# Patient Record
Sex: FEMALE | Race: WHITE | Employment: OTHER | URBAN - METROPOLITAN AREA
[De-identification: names, ages, dates, MRNs, and addresses within clinical notes are randomized per-mention and may not be internally consistent; named-entity substitution may affect disease eponyms.]

---

## 2017-12-13 ENCOUNTER — HOSPITAL ENCOUNTER (OUTPATIENT)
Dept: MAMMOGRAPHY | Age: 67
Discharge: HOME | End: 2017-12-13
Attending: INTERNAL MEDICINE
Payer: MEDICARE

## 2017-12-13 DIAGNOSIS — Z12.31 VISIT FOR SCREENING MAMMOGRAM: ICD-10-CM

## 2017-12-13 PROCEDURE — 77063 BREAST TOMOSYNTHESIS BI: CPT

## 2018-04-22 RX ORDER — ATROPINE SULFATE 0.1 MG/ML
0.5 INJECTION INTRAVENOUS
Status: CANCELLED | OUTPATIENT
Start: 2018-04-22 | End: 2018-04-23

## 2018-04-22 RX ORDER — DEXTROMETHORPHAN/PSEUDOEPHED 2.5-7.5/.8
1.2 DROPS ORAL
Status: CANCELLED | OUTPATIENT
Start: 2018-04-22

## 2018-04-27 ENCOUNTER — HOSPITAL ENCOUNTER (OUTPATIENT)
Age: 68
Setting detail: OUTPATIENT SURGERY
Discharge: HOME | End: 2018-04-27
Attending: INTERNAL MEDICINE | Admitting: INTERNAL MEDICINE
Payer: MEDICARE

## 2018-04-27 VITALS
DIASTOLIC BLOOD PRESSURE: 73 MMHG | OXYGEN SATURATION: 97 % | BODY MASS INDEX: 20.53 KG/M2 | WEIGHT: 120.25 LBS | HEIGHT: 64 IN | TEMPERATURE: 96.8 F | SYSTOLIC BLOOD PRESSURE: 113 MMHG | RESPIRATION RATE: 16 BRPM | HEART RATE: 62 BPM

## 2018-04-27 PROCEDURE — 77030013991 HC SNR POLYP ENDOSC BSC -A: Performed by: INTERNAL MEDICINE

## 2018-04-27 PROCEDURE — 74011250636 HC RX REV CODE- 250/636: Performed by: INTERNAL MEDICINE

## 2018-04-27 PROCEDURE — 74011250636 HC RX REV CODE- 250/636

## 2018-04-27 PROCEDURE — G0500 MOD SEDAT ENDO SERVICE >5YRS: HCPCS | Performed by: INTERNAL MEDICINE

## 2018-04-27 PROCEDURE — 88305 TISSUE EXAM BY PATHOLOGIST: CPT | Performed by: INTERNAL MEDICINE

## 2018-04-27 PROCEDURE — 77010033678 HC OXYGEN DAILY

## 2018-04-27 PROCEDURE — 99153 MOD SED SAME PHYS/QHP EA: CPT | Performed by: INTERNAL MEDICINE

## 2018-04-27 PROCEDURE — 77030003657 HC NDL SCLER BSC -B: Performed by: INTERNAL MEDICINE

## 2018-04-27 PROCEDURE — 77030020256 HC SOL INJ NACL 0.9%  500ML: Performed by: INTERNAL MEDICINE

## 2018-04-27 PROCEDURE — 76040000008: Performed by: INTERNAL MEDICINE

## 2018-04-27 RX ORDER — TRETINOIN 0.5 MG/G
CREAM TOPICAL
COMMUNITY

## 2018-04-27 RX ORDER — GLUCOSAMINE/CHONDR SU A SOD 750-600 MG
5000 TABLET ORAL DAILY
COMMUNITY

## 2018-04-27 RX ORDER — EPINEPHRINE 0.1 MG/ML
1 INJECTION INTRACARDIAC; INTRAVENOUS
Status: DISCONTINUED | OUTPATIENT
Start: 2018-04-27 | End: 2018-04-27 | Stop reason: HOSPADM

## 2018-04-27 RX ORDER — FENTANYL CITRATE 50 UG/ML
100 INJECTION, SOLUTION INTRAMUSCULAR; INTRAVENOUS
Status: DISCONTINUED | OUTPATIENT
Start: 2018-04-27 | End: 2018-04-27 | Stop reason: HOSPADM

## 2018-04-27 RX ORDER — FLUMAZENIL 0.1 MG/ML
0.2 INJECTION INTRAVENOUS
Status: DISCONTINUED | OUTPATIENT
Start: 2018-04-27 | End: 2018-04-27 | Stop reason: HOSPADM

## 2018-04-27 RX ORDER — ESTRADIOL 10 UG/1
10 INSERT VAGINAL
COMMUNITY

## 2018-04-27 RX ORDER — FISH OIL/DHA/EPA 1200-144MG
CAPSULE ORAL DAILY
COMMUNITY

## 2018-04-27 RX ORDER — MIDAZOLAM HYDROCHLORIDE 1 MG/ML
.5-5 INJECTION, SOLUTION INTRAMUSCULAR; INTRAVENOUS
Status: DISCONTINUED | OUTPATIENT
Start: 2018-04-27 | End: 2018-04-27 | Stop reason: HOSPADM

## 2018-04-27 RX ORDER — CLOBETASOL PROPIONATE 0.5 MG/G
AEROSOL, FOAM TOPICAL 2 TIMES DAILY
Status: ON HOLD | COMMUNITY
End: 2019-05-03

## 2018-04-27 RX ORDER — MULTIVITAMIN
1 TABLET ORAL 2 TIMES DAILY
COMMUNITY

## 2018-04-27 RX ORDER — NALOXONE HYDROCHLORIDE 0.4 MG/ML
0.4 INJECTION, SOLUTION INTRAMUSCULAR; INTRAVENOUS; SUBCUTANEOUS
Status: DISCONTINUED | OUTPATIENT
Start: 2018-04-27 | End: 2018-04-27 | Stop reason: HOSPADM

## 2018-04-27 RX ORDER — SODIUM CHLORIDE 9 MG/ML
100 INJECTION, SOLUTION INTRAVENOUS CONTINUOUS
Status: DISPENSED | OUTPATIENT
Start: 2018-04-27 | End: 2018-04-27

## 2018-04-27 RX ADMIN — SODIUM CHLORIDE 100 ML/HR: 900 INJECTION, SOLUTION INTRAVENOUS at 11:55

## 2018-04-27 NOTE — DISCHARGE INSTRUCTIONS
Nidhi Valerio   DISCHARGE SUMMARY from Nurse    PATIENT INSTRUCTIONS:    After general anesthesia or intravenous sedation, for 24 hours or while taking prescription Narcotics:  · Limit your activities  · Do not drive and operate hazardous machinery  · Do not make important personal or business decisions  · Do  not drink alcoholic beverages  · If you have not urinated within 8 hours after discharge, please contact your surgeon on call. Report the following to your surgeon:  · Excessive pain, swelling, redness or odor of or around the surgical area  · Temperature over 100.5  · Nausea and vomiting lasting longer than 4 hours or if unable to take medications  · Any signs of decreased circulation or nerve impairment to extremity: change in color, persistent  numbness, tingling, coldness or increase pain  · Any questions    What to do at Home:  Recommended activity:     If you experience any of the following symptoms , please follow up with [facility]  *  Please give a list of your current medications to your Primary Care Provider. *  Please update this list whenever your medications are discontinued, doses are      changed, or new medications (including over-the-counter products) are added. *  Please carry medication information at all times in case of emergency situations. These are general instructions for a healthy lifestyle:    No smoking/ No tobacco products/ Avoid exposure to second hand smoke  Surgeon General's Warning:  Quitting smoking now greatly reduces serious risk to your health.     Obesity, smoking, and sedentary lifestyle greatly increases your risk for illness    A healthy diet, regular physical exercise & weight monitoring are important for maintaining a healthy lifestyle    You may be retaining fluid if you have a history of heart failure or if you experience any of the following symptoms:  Weight gain of 3 pounds or more overnight or 5 pounds in a week, increased swelling in our hands or feet or shortness of breath while lying flat in bed. Please call your doctor as soon as you notice any of these symptoms; do not wait until your next office visit. Recognize signs and symptoms of STROKE:    F-face looks uneven    A-arms unable to move or move unevenly    S-speech slurred or non-existent    T-time-call 911 as soon as signs and symptoms begin-DO NOT go       Back to bed or wait to see if you get better-TIME IS BRAIN. Warning Signs of HEART ATTACK     Call 911 if you have these symptoms:   Chest discomfort. Most heart attacks involve discomfort in the center of the chest that lasts more than a few minutes, or that goes away and comes back. It can feel like uncomfortable pressure, squeezing, fullness, or pain.  Discomfort in other areas of the upper body. Symptoms can include pain or discomfort in one or both arms, the back, neck, jaw, or stomach.  Shortness of breath with or without chest discomfort.  Other signs may include breaking out in a cold sweat, nausea, or lightheadedness. Don't wait more than five minutes to call 911 - MINUTES MATTER! Fast action can save your life. Calling 911 is almost always the fastest way to get lifesaving treatment. Emergency Medical Services staff can begin treatment when they arrive -- up to an hour sooner than if someone gets to the hospital by car. The discharge information has been reviewed with the patient and spouse. The patient and spouse verbalized understanding. Discharge medications reviewed with the patient and spouse and appropriate educational materials and side effects teaching were provided.   ___________________________________________________________________________________________________________________________________  [id number]  1950    COLON DISCHARGE INSTRUCTIONS    Discomfort:  Redness at IV site- apply warm compress to area; if redness or soreness persist- contact your physician  There may be a slight amount of blood passed from the rectum  Gaseous discomfort- walking, belching will help relieve any discomfort  You may not operate a vehicle til the next day. You may not engage in an occupation involving machinery or appliances til the next day. You may not drink alcoholic beverages til the next day. DIET:   High fiber diet. ACTIVITY:  You may not  resume your normal daily activities til the next day. it is recommended that you spend the remainder of the day resting -  avoid any strenuous activity. CALL M.D.  IF ANY SIGN OF:   Increasing pain, nausea, vomiting  Abdominal distension (swelling)  New increased bleeding (oral or rectal)  Fever (chills)  Pain in chest area  Bloody discharge from nose or mouth  Shortness of breath    You may not  take any Advil, Aspirin, Ibuprofen, Motrin, Aleve, or Goodys for 5 days, ONLY  Tylenol as needed for pain. Post procedure diagnosis:  WEAK ANAL SPHINTER TONE, TORTUOUS COLON, POLYP ASCENDING COLON    Follow-up Instructions: Your follow up colonoscopy will be in 3 to 5 years pending the result of the histology. We will notify you the results of your biopsy by letter within 2 weeks.     Neptali Ward MD  April 27, 2018   Patient armband removed and shredded

## 2018-04-27 NOTE — IP AVS SNAPSHOT
Marylou Torres 
 
 
 [address] [facility] [address] 
[phone] Patient: Bhumika Interiano MRN: [id number] MNI:[id number] About your hospitalization You were admitted on:  April 27, 2018 You last received care in the:  [facility] ENDOSCOPY You were discharged on:  April 27, 2018 Why you were hospitalized Your primary diagnosis was:  Not on File Follow-up Information Follow up With Details Comments Contact Info Anne Molina, [address] [facility] [address] 
[phone] Discharge Orders None A check srinivas indicates which time of day the medication should be taken. My Medications CONTINUE taking these medications Instructions Each Dose to Equal  
 Morning Noon Evening Bedtime Biotin 2,500 mcg Cap Your last dose was: Your next dose is: Take 5,000 mcg by mouth daily. 5000 mcg  
    
   
   
   
  
 calcium-cholecalciferol (D3) tablet Commonly known as:  CALTRATE 600+D Your last dose was: Your next dose is: Take 1 Tab by mouth two (2) times a day. 1 Tab CENTRUM SILVER ULTRA WOMEN'S PO Your last dose was: Your next dose is: Take  by mouth daily. clobetasol 0.05 % topical foam  
Commonly known as:  OLUX Your last dose was: Your next dose is:    
   
   
 Apply  to affected area two (2) times a day. use thin film on affected area  
     
   
   
   
  
 estradiol 10 mcg Tab vaginal tablet Commonly known as:  Emily Rothman Your last dose was: Your next dose is: Insert 10 mcg into vagina every Tuesday and Friday. 10 mcg FEMHRT LOW DOSE 0.5-2.5 mg-mcg per tablet Generic drug:  norethindrone-ethinyl estradiol Your last dose was: Your next dose is: Take 1 Tab by mouth daily. Indications: HRT  
 1 Tab  
    
   
   
   
  
 fish oil-dha-epa 1,200-144-216 mg Cap Your last dose was: Your next dose is: Take  by mouth daily. PROBIOTIC 4X 10-15 mg Tbec Generic drug:  B.infantis-B.ani-B.long-B.bifi Your last dose was: Your next dose is: Take  by mouth daily. psyllium Powd Commonly known as:  METAMUCIL Your last dose was: Your next dose is: Take  by mouth daily. tretinoin 0.05 % topical cream  
Commonly known as:  RETIN-A Your last dose was: Your next dose is:    
   
   
 Apply  to affected area nightly. Discharge Instructions Meaghan Hylton DISCHARGE SUMMARY from Nurse PATIENT INSTRUCTIONS: 
 
After general anesthesia or intravenous sedation, for 24 hours or while taking prescription Narcotics: · Limit your activities · Do not drive and operate hazardous machinery · Do not make important personal or business decisions · Do  not drink alcoholic beverages · If you have not urinated within 8 hours after discharge, please contact your surgeon on call. Report the following to your surgeon: 
· Excessive pain, swelling, redness or odor of or around the surgical area · Temperature over 100.5 · Nausea and vomiting lasting longer than 4 hours or if unable to take medications · Any signs of decreased circulation or nerve impairment to extremity: change in color, persistent  numbness, tingling, coldness or increase pain · Any questions What to do at Home: 
Recommended activity: If you experience any of the following symptoms , please follow up with [facility] *  Please give a list of your current medications to your Primary Care Provider.  
 
*  Please update this list whenever your medications are discontinued, doses are 
 changed, or new medications (including over-the-counter products) are added. *  Please carry medication information at all times in case of emergency situations. These are general instructions for a healthy lifestyle: No smoking/ No tobacco products/ Avoid exposure to second hand smoke Surgeon General's Warning:  Quitting smoking now greatly reduces serious risk to your health. Obesity, smoking, and sedentary lifestyle greatly increases your risk for illness A healthy diet, regular physical exercise & weight monitoring are important for maintaining a healthy lifestyle You may be retaining fluid if you have a history of heart failure or if you experience any of the following symptoms:  Weight gain of 3 pounds or more overnight or 5 pounds in a week, increased swelling in our hands or feet or shortness of breath while lying flat in bed. Please call your doctor as soon as you notice any of these symptoms; do not wait until your next office visit. Recognize signs and symptoms of STROKE: 
 
F-face looks uneven A-arms unable to move or move unevenly S-speech slurred or non-existent T-time-call 911 as soon as signs and symptoms begin-DO NOT go Back to bed or wait to see if you get better-TIME IS BRAIN. Warning Signs of HEART ATTACK Call 911 if you have these symptoms: 
? Chest discomfort. Most heart attacks involve discomfort in the center of the chest that lasts more than a few minutes, or that goes away and comes back. It can feel like uncomfortable pressure, squeezing, fullness, or pain. ? Discomfort in other areas of the upper body. Symptoms can include pain or discomfort in one or both arms, the back, neck, jaw, or stomach. ? Shortness of breath with or without chest discomfort. ? Other signs may include breaking out in a cold sweat, nausea, or lightheadedness. Don't wait more than five minutes to call [address]!  Fast action can save your life. Calling 911 is almost always the fastest way to get lifesaving treatment. Emergency Medical Services staff can begin treatment when they arrive  up to an hour sooner than if someone gets to the hospital by car. The discharge information has been reviewed with the patient and spouse. The patient and spouse verbalized understanding. Discharge medications reviewed with the patient and spouse and appropriate educational materials and side effects teaching were provided. ___________________________________________________________________________________________________________________________________ 
[id number] 
1950 COLON DISCHARGE INSTRUCTIONS Discomfort: 
Redness at IV site- apply warm compress to area; if redness or soreness persist- contact your physician There may be a slight amount of blood passed from the rectum Gaseous discomfort- walking, belching will help relieve any discomfort You may not operate a vehicle til the next day. You may not engage in an occupation involving machinery or appliances til the next day. You may not drink alcoholic beverages til the next day. DIET: 
 High fiber diet. ACTIVITY: 
You may not  resume your normal daily activities til the next day. it is recommended that you spend the remainder of the day resting -  avoid any strenuous activity. CALL DONALD Lobato ANY SIGN OF: Increasing pain, nausea, vomiting Abdominal distension (swelling) New increased bleeding (oral or rectal) Fever (chills) Pain in chest area Bloody discharge from nose or mouth Shortness of breath You may not  take any Advil, Aspirin, Ibuprofen, Motrin, Aleve, or Goodys for 5 days, ONLY  Tylenol as needed for pain. Post procedure diagnosis:  WEAK ANAL SPHINTER TONE, TORTUOUS COLON, POLYP ASCENDING COLON Follow-up Instructions: Your follow up colonoscopy will be in 3 to 5 years pending the result of the histology. We will notify you the results of your biopsy by letter within 2 weeks. Rick Dee MD 
April 27, 2018 Patient armband removed and shredded Introducing [facility]! [facility] introduces [facility] patient portal. Now you can access parts of your medical record, email your doctor's office, and request medication refills online. 1. In your internet browser, go to [email or web address]. [employer] 2. Click on the First Time User? Click Here link in the Sign In box. You will see the New Member Sign Up page. 3. Enter your [facility] Access Code exactly as it appears below. You will not need to use this code after youve completed the sign-up process. If you do not sign up before the expiration date, you must request a new code. · [facility] Access Code: [email or web address] Expires: 7/26/2018  2:27 PM 
 
4. Enter the last four digits of your Social Security Number (xxxx) and Date of Birth (mm/dd/yyyy) as indicated and click Submit. You will be taken to the next sign-up page. 5. Create a [facility] ID. This will be your [facility] login ID and cannot be changed, so think of one that is secure and easy to remember. 6. Create a [facility] password. You can change your password at any time. 7. Enter your Password Reset Question and Answer. This can be used at a later time if you forget your password. 8. Enter your e-mail address. You will receive e-mail notification when new information is available in [address] 9. Click Sign Up. You can now view and download portions of your medical record. 10. Click the Download Summary menu link to download a portable copy of your medical information. If you have questions, please visit the Frequently Asked Questions section of the [facility] website. Remember, [facility] is NOT to be used for urgent needs. For medical emergencies, dial 911. Now available from your iPhone and Android! Introducing Sebastian Dale As a [employer] [facility] patient, I wanted to make you aware of our electronic visit tool called Sebastian Dale. [email or web address] allows you to connect within minutes with a medical provider 24 hours a day, seven days a week via a mobile device or tablet or logging into a secure website from your computer. You can access Sebastian Osullivanfin from anywhere in the [place]. A virtual visit might be right for you when you have a simple condition and feel like you just dont want to get out of bed, or cant get away from work for an appointment, when your regular [facility] provider is not available (evenings, weekends or holidays), or when youre out of town and need minor care. Electronic visits cost only $49 and if the [employer] provider determines a prescription is needed to treat your condition, one can be electronically transmitted to a nearby pharmacy*. Please take a moment to enroll today if you have not already done so. The enrollment process is free and takes just a few minutes. To enroll, please download the [email or web address] nathaniel to your tablet or phone, or visit [email or web address] to enroll on your computer. And, as an [address] [place] [address] patient with a [employer] account, the results of your visits will be scanned into your electronic medical record and your primary care provider will be able to view the scanned results. We urge you to continue to see your regular [facility] provider for your ongoing medical care. And while your primary care provider may not be the one available when you seek a Sebastian Johnsongefin virtual visit, the peace of mind you get from getting a real diagnosis real time can be priceless. For more information on Sebastian Alexgefin, view our Frequently Asked Questions (FAQs) at [email or web address]. Sincerely, 
 
Stephen West MD 
Chief Medical Officer [facility] *:  certain medications cannot be prescribed via Sebastian Dale Providers Seen During Your Hospitalization Provider Specialty Primary office phone Leah Smith MD Gastroenterology [phone] Your Primary Care Physician (PCP) Primary Care Physician Office Phone Office Fax Jun Martinez, [address] [place] [address] [phone] You are allergic to the following No active allergies Recent Documentation Height Weight Breastfeeding? BMI OB Status Smoking Status 1.626 m 54.5 kg No 20.64 kg/m2 Postmenopausal Never Smoker Emergency Contacts Name Discharge Info Relation Home Work Mobile LizzieMike DISCHARGE CAREGIVER [3] Spouse [3] [phone] Patient Belongings The following personal items are in your possession at time of discharge: 
  Dental Appliances: None  Visual Aid: Glasses Please provide this summary of care documentation to your next provider. Signatures-by signing, you are acknowledging that this After Visit Summary has been reviewed with you and you have received a copy. Patient Signature:  ____________________________________________________________ Date:  ____________________________________________________________  
  
Alexandra Holmanulds Provider Signature:  ____________________________________________________________ Date:  ____________________________________________________________

## 2018-04-27 NOTE — PROCEDURES
[facility]  Colonoscopy Procedure Report  _______________________________________________________  Patient: Tank Escobar                                         Attending Physician: Radha Lee MD    Patient ID: [id number]                                      Referring Physician: Maddison Flaherty MD    Exam Date: April 27, 2018 _______________________________________________________      Introduction: A  79 y.o. female patient, presents for outpatient Colonoscopy    Indications: Screen colon cancer,  Had a previous colonoscopy by Dr Jose Brown on Jan 18, 2013:  one small tubular adenoma in the sigmoid and  one medium sized hyperplastic polyp in the ascending . Mild sigmoid diverticulosis, small internal hemorrhoids. She has no family history of colon cancer and asymptomatic. Consent: The benefits, risks, and alternatives to the procedure were discussed and informed consent was obtained from the patient. Preparation: EKG, pulse, pulse oximetry and blood pressure were monitored throughout the procedure. ASA Classification: Class 1 - . The heart is an S1-S2 and regular heart rate and rhythm. Lungs are clear to auscultation and percussion. Abdomen is soft, nondistended, and nontender. Mental Status: awake, alert, and oriented to person, place, and time    Medications:  · Fentanyl 100 mcg IV before procedure. · Versed 5 mg IV throughout the procedure. Rectal Exam: Decreased anal sphincter tone. No Blood. Pathology Specimens: One specimens removed. Procedure: The colonoscope was passed with difficulty through the anus under direct visualization and advanced to the cecum and 10 cm inside the terminal ileum. The patient required positioning on the back to aid in the passage of the scope. The scope was withdrawn and the mucosa was carefully examined. The quality of the preparation was excellent. The views were excellent. The patient's toleration of the procedure was excellent.  The exam was done twice to the cecum. Total time is 42 minutes and withdrawal time is 29 minutes. Findings:    Rectum:   Small internal hemorrhoids. Sigmoid:   Very tortuous and difficult sigmoid colon with mild diverticulosis. Descending Colon:   Normal  Transverse Colon:   Normal  Ascending Colon:   9 mm very flat polyp? in the proximal ascending colon very hard to see located behind a fold and starting at the edge of a small diverticulum hidden behind that fold. Initially, I had to inject it with Saline to lift it up but the liquid desipated and leaked out quickly then I injected the Eliview liquid which is heavier, but the polyp was very slippery and difficult to grab with the snare for that reason and the difficult location only the central part of the polyp was resected. Also being next to a diverticulum would make pursuing any further resection at this level reskier. Cecum:   Normal  Terminal Ileum:   Normal      Unplanned Events: There were no unplanned events. Estimated Blood Loss: None  Impressions:    Decreased anal sphincter tone. Small internal hemorrhoids. Very tortuous and difficult sigmoid colon with mild sigmoid diverticulosis. 9 mm very flat polyp? in the proximal ascending colon very hard to see located behind a fold and starting at the edge of a small diverticulum hidden behind that fold. Initially, I had to inject it with Saline to lift it up but the liquid desipated and leaked out quickly then I injected the Eliview liquid which is heavier, but the polyp was very slippery and difficult to grab with the snare for that reason and the difficult location only the central part of the polyp was resected. Also being next to a diverticulum would make pursuing any further resection at this level reskier. Normal Mucosa. Complications: None; patient tolerated the procedure well. Recommendations:  · Discharge home when standard parameters are met. · Resume a high fiber diet.   · Colonoscopy recommendation in 3 to 5 years awaiting the result of the histology.     Procedure Codes:    · Susy Soren [[id number]]    Endoscope Information:  Model Number(s)    [id number]     Assistant: None      Signed By: Vik Hernandes MD Date: April 27, 2018

## 2019-03-07 ENCOUNTER — HOSPITAL ENCOUNTER (OUTPATIENT)
Dept: GENERAL RADIOLOGY | Age: 69
Discharge: HOME | End: 2019-03-07
Attending: INTERNAL MEDICINE
Payer: MEDICARE

## 2019-03-07 DIAGNOSIS — R10.84 GENERALIZED ABDOMINAL PAIN: ICD-10-CM

## 2019-03-07 PROCEDURE — 74011000255 HC RX REV CODE- 255: Performed by: INTERNAL MEDICINE

## 2019-03-07 PROCEDURE — 74250 X-RAY XM SM INT 1CNTRST STD: CPT

## 2019-03-07 RX ADMIN — BARIUM SULFATE 600 ML: 240 SUSPENSION ORAL at 09:50

## 2019-05-03 ENCOUNTER — HOSPITAL ENCOUNTER (OUTPATIENT)
Age: 69
Setting detail: OUTPATIENT SURGERY
Discharge: HOME | End: 2019-05-03
Attending: INTERNAL MEDICINE | Admitting: INTERNAL MEDICINE
Payer: MEDICARE

## 2019-05-03 VITALS
TEMPERATURE: 97.5 F | WEIGHT: 123.4 LBS | RESPIRATION RATE: 15 BRPM | OXYGEN SATURATION: 97 % | HEIGHT: 64 IN | SYSTOLIC BLOOD PRESSURE: 104 MMHG | DIASTOLIC BLOOD PRESSURE: 65 MMHG | BODY MASS INDEX: 21.07 KG/M2 | HEART RATE: 73 BPM

## 2019-05-03 PROCEDURE — 74011250636 HC RX REV CODE- 250/636

## 2019-05-03 PROCEDURE — 77030032490 HC SLV COMPR SCD KNE COVD -B: Performed by: INTERNAL MEDICINE

## 2019-05-03 PROCEDURE — 77030013991 HC SNR POLYP ENDOSC BSC -A: Performed by: INTERNAL MEDICINE

## 2019-05-03 PROCEDURE — 99153 MOD SED SAME PHYS/QHP EA: CPT | Performed by: INTERNAL MEDICINE

## 2019-05-03 PROCEDURE — 88305 TISSUE EXAM BY PATHOLOGIST: CPT

## 2019-05-03 PROCEDURE — 74011250636 HC RX REV CODE- 250/636: Performed by: INTERNAL MEDICINE

## 2019-05-03 PROCEDURE — G0500 MOD SEDAT ENDO SERVICE >5YRS: HCPCS | Performed by: INTERNAL MEDICINE

## 2019-05-03 PROCEDURE — 77030020256 HC SOL INJ NACL 0.9%  500ML: Performed by: INTERNAL MEDICINE

## 2019-05-03 PROCEDURE — 76040000008: Performed by: INTERNAL MEDICINE

## 2019-05-03 RX ORDER — NALOXONE HYDROCHLORIDE 0.4 MG/ML
0.4 INJECTION, SOLUTION INTRAMUSCULAR; INTRAVENOUS; SUBCUTANEOUS
Status: DISCONTINUED | OUTPATIENT
Start: 2019-05-03 | End: 2019-05-03 | Stop reason: HOSPADM

## 2019-05-03 RX ORDER — DIPHENHYDRAMINE HYDROCHLORIDE 50 MG/ML
50 INJECTION, SOLUTION INTRAMUSCULAR; INTRAVENOUS ONCE
Status: DISCONTINUED | OUTPATIENT
Start: 2019-05-03 | End: 2019-05-03 | Stop reason: HOSPADM

## 2019-05-03 RX ORDER — FENTANYL CITRATE 50 UG/ML
100 INJECTION, SOLUTION INTRAMUSCULAR; INTRAVENOUS
Status: DISCONTINUED | OUTPATIENT
Start: 2019-05-03 | End: 2019-05-03 | Stop reason: HOSPADM

## 2019-05-03 RX ORDER — EPINEPHRINE 0.1 MG/ML
1 INJECTION INTRACARDIAC; INTRAVENOUS
Status: DISCONTINUED | OUTPATIENT
Start: 2019-05-03 | End: 2019-05-03 | Stop reason: HOSPADM

## 2019-05-03 RX ORDER — MIDAZOLAM HYDROCHLORIDE 1 MG/ML
.25-5 INJECTION, SOLUTION INTRAMUSCULAR; INTRAVENOUS
Status: DISCONTINUED | OUTPATIENT
Start: 2019-05-03 | End: 2019-05-03 | Stop reason: HOSPADM

## 2019-05-03 RX ORDER — GABAPENTIN 300 MG/1
300 CAPSULE ORAL 3 TIMES DAILY
COMMUNITY

## 2019-05-03 RX ORDER — FLUMAZENIL 0.1 MG/ML
0.2 INJECTION INTRAVENOUS
Status: DISCONTINUED | OUTPATIENT
Start: 2019-05-03 | End: 2019-05-03 | Stop reason: HOSPADM

## 2019-05-03 RX ORDER — ASPIRIN 325 MG
650 TABLET ORAL DAILY
COMMUNITY

## 2019-05-03 RX ORDER — SODIUM CHLORIDE 0.9 % (FLUSH) 0.9 %
5-40 SYRINGE (ML) INJECTION AS NEEDED
Status: CANCELLED | OUTPATIENT
Start: 2019-05-03

## 2019-05-03 RX ORDER — SODIUM CHLORIDE 0.9 % (FLUSH) 0.9 %
5-40 SYRINGE (ML) INJECTION EVERY 8 HOURS
Status: CANCELLED | OUTPATIENT
Start: 2019-05-03

## 2019-05-03 RX ORDER — SODIUM CHLORIDE 9 MG/ML
150 INJECTION, SOLUTION INTRAVENOUS CONTINUOUS
Status: DISCONTINUED | OUTPATIENT
Start: 2019-05-03 | End: 2019-05-03 | Stop reason: HOSPADM

## 2019-05-03 RX ORDER — DEXTROMETHORPHAN/PSEUDOEPHED 2.5-7.5/.8
1.2 DROPS ORAL
Status: DISCONTINUED | OUTPATIENT
Start: 2019-05-03 | End: 2019-05-03 | Stop reason: HOSPADM

## 2019-05-03 RX ORDER — SODIUM CHLORIDE 9 MG/ML
1000 INJECTION, SOLUTION INTRAVENOUS CONTINUOUS
Status: CANCELLED | OUTPATIENT
Start: 2019-05-03 | End: 2019-05-03

## 2019-05-03 RX ORDER — ATROPINE SULFATE 0.1 MG/ML
0.5 INJECTION INTRAVENOUS
Status: DISCONTINUED | OUTPATIENT
Start: 2019-05-03 | End: 2019-05-03 | Stop reason: HOSPADM

## 2019-05-03 RX ADMIN — SODIUM CHLORIDE 150 ML/HR: 900 INJECTION, SOLUTION INTRAVENOUS at 09:09

## 2019-05-03 NOTE — H&P
This 79year old female presents for Colon Cancer Screening. History of Present Illness: 1. Colon Cancer Screening Prior screening:  colonoscopy and 13 Dr. Pranay Carr. Risk Factors: history of other malignancy and S-Breast.  Pertinent negatives include abdominal pain, change in bowel habits, change in stool caliber, constipation, decreased appetite, diarrhea, melena, nausea, rectal bleeding, vomiting, weight gain and weight loss. Additional information: No family history of colon cancer, No family history of Crohn's/colitis, No NSAID/ASA use, Appendectomy at age 17. She denies heart attack, SOB, CP, CVA, DM, GERD, paralysis. BM 3 times a day or more, no blood. No tobacco use and ETOH socially. Colonoscopy 13 by Dr. Pranay Carr indication screening. Findings mild diverticulosis in the sigmoid, internal hemorrhoids, Tubular adenoma in the sigmoid and hyperplastic polyp in the ascending colon. PROBLEM LIST: 
Problem Description Onset Date Lipoma of skin and subcutaneous tissue (excluding face) 2014 Impaired fasting glycaemia 2014 Internal hemorrhoids, simple 2016 Hyperlipidemia 2014 PAST MEDICAL/SURGICAL HISTORY   (Detailed) Disease/disorder Onset Date Management Date Comments Hemolytic uremic syndrome 2016     
  bunionectomy 2010   
  eyelid repair 2007 Appendectomy 1963 Left Foot Surgery Appendectomy DIAGNOSTICS HISTORY: 
Test Ordered Interpretation Result completed * Calcaneus, AP/ Lat Or Foot 3 Views 2012  Significant joint space narrowing of the left great toe joint. Previous osteotomy noted. 2012 Test Ordered Ordering Comments Modifier * Calcaneus, AP/ Lat Or Foot 3 Views 2012 Family History:  (Detailed) Relationship Family Member Name  Age at Death Condition Onset Age Cause of Death Father    Heart disease  N Father  Y 80 Alzheimer's Disease  N  
 Mother  N  Hypertension  N Sister  N  Cancer, breast  N Family History Comments Relationship Family Member Name Condition Comments Sister  Cancer, breast early 35s- no genetic testing EC 12/06/2017 -REcently did genetic testing and negative Social History:  (Detailed) Tobacco use reviewed. Preferred language is [place]. MARITAL STATUS/FAMILY/SOCIAL SUPPORT Currently . Tobacco use status: Never smoked tobacco. 
Smoking status: Never smoker. SMOKING STATUS Use Status Type Smoking Status Usage Per Day Years Used Total Pack Years  
no/never  Never smoker No passive smoke exposure. ALCOHOL There is a history of alcohol use. consumed socially. CAFFEINE The patient uses caffeine: coffee - 2 cups a day. LIFESTYLE 
Moderate activity level. Exercises 3-4 times a week. DIET 
healthy. Patient Status Completed with information received for patient transitioning into care. Medication Reconciliation Medications reconciled today. Medication Reviewed Adherence Medication Name Sig Desc Elsewhere Status  
taking as directed Centrum Silver tablet  N Verified  
taking as directed Caltrate-600 + D Vit D3 (800) 600 mg (1,500 mg)-800 unit tablet  N Verified  
taking as directed Fish Oil 360 mg-1,200 mg capsule take 1 by Oral route  every day Y Verified  
taking as directed Align 4 mg capsule  Y Verified  
taking as directed Metamucil 3.4 gram/5.4 gram oral powder  Y Verified  
taking as directed tretinoin 0.05 % topical cream apply by topical route  every day to the affected area(s) at bedtime N Verified  
taking as directed biotin 5,000 mcg disintegrating tablet  N Verified  
taking as directed Protopic 0.1 % topical ointment apply by topical route  every day a thin layer to the affected area(s) ; rub in gently and completely N Verified  
taking as directed clobetasol 0.05 % topical foam apply by topical route 2 times every day to the affected area(s) in the morning and evening N Verified  
taking as directed Latisse 0.03 % eyelash drops apply 1 drop by topical route  every day to applicator and apply to upper eyelid, along eyelashes, at nighttime N Verified  
taking as directed Suprep Bowel Prep Kit 17.5 gram-3.13 gram-1.6 gram oral solution take as prescribed by physician N Verified  
taking as directed doxycycline hyclate 100 mg capsule take 1 capsule by oral route  every day N Verified Medications (added, continued or stopped this visit): 
Started Medication Directions Instruction Stopped Align 4 mg capsule 02/07/2018 biotin 5,000 mcg disintegrating tablet 12/08/2013 Caltrate-600 + D Vit D3 (800) 600 mg (1,500 mg)-800 unit tablet 12/08/2013 Centrum Silver tablet 02/14/2018 clobetasol 0.05 % topical foam apply by topical route 2 times every day to the affected area(s) in the morning and evening 04/05/2018 doxycycline hyclate 100 mg capsule take 1 capsule by oral route  every day 06/09/2014 Fish Oil 360 mg-1,200 mg capsule take 1 by Oral route  every day 02/14/2018 Latisse 0.03 % eyelash drops apply 1 drop by topical route  every day to applicator and apply to upper eyelid, along eyelashes, at nighttime Metamucil 3.4 gram/5.4 gram oral powder     
02/14/2018 Protopic 0.1 % topical ointment apply by topical route  every day a thin layer to the affected area(s) ; rub in gently and completely 04/19/2018 Suprep Bowel Prep Kit 17.5 gram-3.13 gram-1.6 gram oral solution take as prescribed by physician    
02/05/2018 tretinoin 0.05 % topical cream apply by topical route  every day to the affected area(s) at bedtime Allergies: 
Ingredient Reaction Medication Name Comment BRIMONIDINE TARTRATE Discomfort;Stomach Pain;Chest pain Mirvaso (Reviewed, no changes.) Review of Systems System Neg/Pos Details Constitutional Negative Chills, fever, malaise, weight gain and weight loss. ENMT Negative Nasal congestion. Eyes Negative Double vision. Respiratory Negative Asthma and chronic cough. Cardio Negative Chest pain, edema and irregular heartbeat/palpitations. GI Negative Abdominal pain, change in bowel habits, change in stool caliber, constipation, decreased appetite, diarrhea, dysphagia, heartburn, hematemesis, hematochezia, melena, nausea, rectal bleeding, reflux and vomiting.  Negative Dysuria and hematuria. Endocrine Negative Cold intolerance, heat intolerance and increased thirst.  
Neuro Negative Dizziness, headache, numbness, tremors and vertigo. Psych Negative Anxiety, depression and increased stress. Integumentary Negative Hives and rash. MS Positive Joint pain. MS Negative Back pain and myalgia. Hema/Lymph Negative Easy bleeding and easy bruising. Allergic/Immuno Positive Seasonal allergies. Allergic/Immuno Negative Contact allergy and food allergies. Vital Signs Height Time ft in cm Last Measured Height Position 11:03 AM 5.0 3.50 161.29 04/19/2018 Standing 05/03/19 0850 97.5 °F (36.4 °C) 71 140/81 101   16 98 % Room air   56 kg (123 lb 6.4 oz) PHYSICAL EXAM: 
Exam Findings Details Constitutional Normal No acute distress. Well Nourished. Well developed. Eyes Normal General - Right: Normal, Left: Normal. Conjunctiva - Right: Normal, Left: Normal. Sclera - Right: Normal, Left: Normal. Pupil - Right: Normal, Left: Normal.  
Nose/Mouth/Throat Normal Lips/teeth/gums - Normal. Tongue - Normal. Buccal mucosa - Normal. Palate & uvula - Normal.  
Neck Exam Normal Inspection - Normal. Palpation - Normal. Thyroid gland - Normal. Submandibular lymph nodes - Normal.  
Lymph Detail Normal Submandibular. Anterior cervical. Posterior cervical. Supraclavicular.   
Respiratory Normal Inspection - Normal. Auscultation - Normal. Percussion - Normal. Cough - Absent. Effort - Normal.  
Cardiovascular Normal Heart rate - Regular rate. Heart sounds - Normal S1, Normal S2. Murmurs - None. Extremities - No edema. Abdomen Normal Inspection - Normal. Appliance(s) - None. Abdominal muscles - Normal. Auscultation - Normal. Percussion - Normal. Anterior palpation - Normal, No guarding, No rebound. CVA tenderness - None. Umbilicus - Normal. No abdominal tenderness. No hepatic enlargement. No splenic enlargement. No hernia. No Ascites. No palpable mass. Mattson's sign - Negative. Skin Normal Inspection - Normal.  
Musculoskeletal Normal Hands - Left: Normal, Right: Normal.  
Extremity Normal No cyanosis. No edema. Clubbing - Absent. Neurological Normal Level of consciousness - Normal. Orientation - Normal. Memory - Normal. Motor - Normal. Balance & gait - Normal. Coordination - Normal. Fine motor skills - Normal. DTRs - Normal.  
Psychiatric Normal Orientation - Oriented to time, place, person & situation. Not anxious. Appropriate mood and affect. Behavior appropriate for age. Sufficient fund of knowledge. Sufficient language. No memory loss. Assessment/Plan # Detail Type Description 1. Assessment Personal history of colonic polyps (Z86.010). Patient Plan  colonoscopy by Dr Chadwick Tsai in 2013 one small tubular adenoma in the sigmoid and  one medium sized hyperplastic polyp in the ascending . Mild sigmoid diverticulosis, small internal hemorrhoids. She watching her diet and exercising. appendectomy, . No smoking or ETOH abuse. She has average risk for colon cancer. She is alert asymptomatic. She is taking probiotic daily and has no longer diarrhea. She would be having her second colonoscopy. I explained to her the procedure of colonoscopy and the risks involved which include but not limited to reaction to sedation, bleeding, perforation, infection or missing a lesion if bowels are not well prepped or are unusually tortuous.  she agreed to proceed with the procedure and answered her questions. I gave her the Suprep to clean her bowels. She is in excellent health. Her sister had breast cancer NO CHANGE IN H&P

## 2019-05-03 NOTE — DISCHARGE INSTRUCTIONS
Nguyễn Mayer  [id number]  1950    COLON DISCHARGE INSTRUCTIONS    Discomfort:  Redness at IV site- apply warm compress to area; if redness or soreness persist- contact your physician  There may be a slight amount of blood passed from the rectum  Gaseous discomfort- walking, belching will help relieve any discomfort  You may not operate a vehicle til the next day. You may not engage in an occupation involving machinery or appliances til the next day. You may not drink alcoholic beverages til the next day. DIET:   High fiber diet. ACTIVITY:  You may not  resume your normal daily activities til the next day. it is recommended that you spend the remainder of the day resting -  avoid any strenuous activity. CALL M.D.  IF ANY SIGN OF:   Increasing pain, nausea, vomiting  Abdominal distension (swelling)  New increased bleeding (oral or rectal)  Fever (chills)  Pain in chest area  Bloody discharge from nose or mouth  Shortness of breath    You may not  take any Advil, Aspirin, Ibuprofen, Motrin, Aleve, or Goodys for  14 days, ONLY  Tylenol as needed for pain. Post procedure diagnosis:  Tortuous colon sigmoid diverticulosis. Ascending colon flat polyp decrease anal sphincter tone    Follow-up Instructions: Your follow up colonoscopy will be in  3 years. We will notify you the results of your biopsy by letter within 2 weeks.     Tayo Hanson MD  May 3, 2019       DISCHARGE SUMMARY from Nurse    The following personal items collected during your admission are returned to you:   Dental Appliance: Dental Appliances: None  Vision: Visual Aid: Glasses, With patient  Hearing Aid:    Jewelry:    Clothing:    Other Valuables:    Valuables sent to safe:              PATIENT INSTRUCTIONS:    After general anesthesia or intravenous sedation, for 24 hours or while taking prescription Narcotics:  · Limit your activities  · Do not drive and operate hazardous machinery  · Do not make important personal or business decisions  · Do  not drink alcoholic beverages  · If you have not urinated within 8 hours after discharge, please contact your surgeon on call. Report the following to your surgeon:  · Excessive pain, swelling, redness or odor of or around the surgical area  · Temperature over 100.5  · Nausea and vomiting lasting longer than 4 hours or if unable to take medications  · Any signs of decreased circulation or nerve impairment to extremity: change in color, persistent  numbness, tingling, coldness or increase pain  · Any questions      No orders of the defined types were placed in this encounter. What to do at Home:  Recommended activity: as above,     If you experience any of the following symptoms as above, please follow up with Dr. Isael Hendrickson. *  Please give a list of your current medications to your Primary Care Provider. *  Please update this list whenever your medications are discontinued, doses are      changed, or new medications (including over-the-counter products) are added. *  Please carry medication information at all times in case of emergency situations. These are general instructions for a healthy lifestyle:    No smoking/ No tobacco products/ Avoid exposure to second hand smoke    Surgeon General's Warning:  Quitting smoking now greatly reduces serious risk to your health. Obesity, smoking, and sedentary lifestyle greatly increases your risk for illness    A healthy diet, regular physical exercise & weight monitoring are important for maintaining a healthy lifestyle    You may be retaining fluid if you have a history of heart failure or if you experience any of the following symptoms:  Weight gain of 3 pounds or more overnight or 5 pounds in a week, increased swelling in our hands or feet or shortness of breath while lying flat in bed. Please call your doctor as soon as you notice any of these symptoms; do not wait until your next office visit.     Recognize signs and symptoms of STROKE:    F-face looks uneven    A-arms unable to move or move unevenly    S-speech slurred or non-existent    T-time-call 911 as soon as signs and symptoms begin-DO NOT go       Back to bed or wait to see if you get better-TIME IS BRAIN. The discharge information has been reviewed with the patient and spouse. The patient and spouse verbalized understanding. Warning Signs of HEART ATTACK     Call 911 if you have these symptoms:   Chest discomfort. Most heart attacks involve discomfort in the center of the chest that lasts more than a few minutes, or that goes away and comes back. It can feel like uncomfortable pressure, squeezing, fullness, or pain.  Discomfort in other areas of the upper body. Symptoms can include pain or discomfort in one or both arms, the back, neck, jaw, or stomach.  Shortness of breath with or without chest discomfort.  Other signs may include breaking out in a cold sweat, nausea, or lightheadedness. Don't wait more than five minutes to call 911 - MINUTES MATTER! Fast action can save your life. Calling 911 is almost always the fastest way to get lifesaving treatment. Emergency Medical Services staff can begin treatment when they arrive -- up to an hour sooner than if someone gets to the hospital by car. The discharge information has been reviewed with the patient and caregiver. The patient and caregiver verbalized understanding. Discharge medications reviewed with the patient and guardian and appropriate educational materials and side effects teaching were provided.     Patient armband removed and shredded

## 2019-05-03 NOTE — PROCEDURES
[facility]  Colonoscopy Procedure Report  _______________________________________________________  Patient: Emre Menendez                                         Attending Physician: Juan Mckeon MD    Patient ID: [id number]                                      Referring Physician: Aida Elizabeth MD    Exam Date: May 3, 2019 _______________________________________________________      Introduction: A  81 y.o. female patient, presents for outpatient Colonoscopy    Indications: a patient of Dr Amber Hazel who had a colonoscopy by Dr Tex Howard 2013: a small tubular adenoma in the sigmoid and  A medium sized hyperplastic polyp in the ascending . Mild sigmoid diverticulosis, small internal hemorrhoids. Last colonoscopy done by my self on 2018: Decreased anal sphincter tone. Small internal hemorrhoids. Very tortuous and difficult sigmoid colon with mild sigmoid diverticulosis. 9 mm very flat polyp? in the proximal ascending colon very hard to see located behind a fold and starting at the edge of a small diverticulum hidden behind that fold. Initially, I had to inject it with Saline to lift it up but the liquid desipated and leaked out quickly then I injected the Eliview liquid which is heavier, but the polyp was very slippery and difficult to grab with the snare for that reason and the difficult location only the central part of the polyp was resected. Also being next to a diverticulum would make pursuing any further resection at this level reskier. Normal Mucosa. PMH: appendectomy, . She has average risk for colon cancer and presently asymptomatic. She is taking probiotic daily and has no longer diarrhea. Consent: The benefits, risks, and alternatives to the procedure were discussed and informed consent was obtained from the patient. Preparation: EKG, pulse, pulse oximetry and blood pressure were monitored throughout the procedure. ASA Classification: Class 1 - .  The heart is an S1-S2 and regular heart rate and rhythm. Lungs are clear to auscultation and percussion. Abdomen is soft, nondistended, and nontender. Mental Status: awake, alert, and oriented to person, place, and time    Medications:  · Fentanyl 100 mcg IV, Versed 5 mg IV and Glucagon 1 mg IV throughout the procedure. Rectal Exam: decreased anal sphincter tone. No Blood. Pathology Specimens: One specimens removed. Procedure: The colonoscope was passed with difficulty through the anus under direct visualization and advanced to the cecum. The patient required positioning on the back to aid in the passage of the scope. The scope was withdrawn and the mucosa was carefully examined. The quality of the preparation was excellent. The views were excellent. The patient's toleration of the procedure was excellent. Retroflexion was preformed in the ascending colon and hepatic flexure. The exam was done twice to the cecum. Total time is 42 minutes and withdrawal time is 34 minutes. Findings:    Rectum:   Normal  Sigmoid:   Very tortuous and spastic sigmoid with mild sigmoid diverticulosis  Descending Colon:   Normal  Transverse Colon:   Normal  Ascending Colon:   A very flat polyp > 7 mm very hard to see and distinguish at the entrance of a medium sized diverticulum that was impacted with stools behind a fold located in the very proximal ascending colon only seen on retroflexion. After removing the impacted stools from the diverticulum. I injected the polyp with saline but once again it was hard to grab with the snare and was rather sloughing the surface. I had to removed it by pieces with cold forceps then cauterized the base and the dedges with the monopolar current. Cecum:   Normal  Terminal Ileum:   Not seen      Unplanned Events: There were no unplanned events. Estimated Blood Loss: Minimal  Impressions: Moderately decreased anal sphincter tone.  Very tortuous and spastic sigmoid with mild sigmoid diverticulosis. A very flat polyp > 7 mm very hard to see and distinguish at the entrance of a medium sized diverticulum that was impacted with stools behind a fold located in the very proximal ascending colon only seen on retroflexion. After removing the impacted stools from the diverticulum. I injected the polyp with saline but once again it was hard to grab with the snare and was rather sloughing the surface. I had to removed it by pieces with cold forceps then cauterized the base and the dedges with the monopolar current. Normal Mucosa. No other adenoma polyps found. Complications: None; patient tolerated the procedure well. Recommendations:  · Discharge home when standard parameters are met. · Resume a high fiber diet. · Colonoscopy recommendation in 3 years.     Procedure Codes:    · Randall Baum [[id number]]  · COLONOSCPY,FLEX,W/ Celeste Silva [[id number]]    Endoscope Information:  Model Number(s)    [id number]   Assistant: None    Signed By: Lopez Duarte MD Date: May 3, 2019

## 2022-06-04 NOTE — H&P
Proper hygiene and care of contact lenses discussed. This 79year old female presents for Colon Cancer Screening. History of Present Illness:  1. Colon Cancer Screening      Prior screening:  colonoscopy and 13 Dr. Tamie Gibbs. Risk Factors: history of other malignancy and S-Breast.  Pertinent negatives include abdominal pain, change in bowel habits, change in stool caliber, constipation, decreased appetite, diarrhea, melena, nausea, rectal bleeding, vomiting, weight gain and weight loss. Additional information: No family history of colon cancer, No family history of Crohn's/colitis, No NSAID/ASA use, Appendectomy at age 17. She denies heart attack, SOB, CP, CVA, DM, GERD, paralysis. BM 3 times a day or more, no blood. No tobacco use and ETOH socially. Colonoscopy 13 by Dr. Tamie Gibbs indication screening. Findings mild diverticulosis in the sigmoid, internal hemorrhoids, Tubular adenoma in the sigmoid and hyperplastic polyp in the ascending colon. PROBLEM LIST:  Problem Description Onset Date   Lipoma of skin and subcutaneous tissue (excluding face) 2014   Impaired fasting glycaemia 2014   Internal hemorrhoids, simple 2016   Hyperlipidemia 2014         PAST MEDICAL/SURGICAL HISTORY   (Detailed)    Disease/disorder Onset Date Management Date Comments   Hemolytic uremic syndrome 2016        bunionectomy 2010      eyelid repair 2007      Appendectomy 1963      Left Foot Surgery       Appendectomy       DIAGNOSTICS HISTORY:  Test Ordered Interpretation Result completed   * Calcaneus, AP/ Lat Or Foot 3 Views 2012  Significant joint space narrowing of the left great toe joint. Previous osteotomy noted.  2012     Test Ordered Ordering Comments Modifier   * Calcaneus, AP/ Lat Or Foot 3 Views 2012       Family History:  (Detailed)  Relationship Family Member Name  Age at Death Condition Onset Age Cause of Death   Father    Heart disease  N   Father  Y 80 Alzheimer's Disease  N   Mother N  Hypertension  N   Sister  N  Cancer, breast  N     Family History Comments  Relationship Family Member Name Condition Comments   Sister  Cancer, breast early 35s- no genetic testing EC 12/06/2017 -REcently did genetic testing and negative       Social History:  (Detailed)  Tobacco use reviewed. Preferred language is [place]. MARITAL STATUS/FAMILY/SOCIAL SUPPORT  Currently . Tobacco use status: Never smoked tobacco.  Smoking status: Never smoker. SMOKING STATUS  Use Status Type Smoking Status Usage Per Day Years Used Total Pack Years   no/never  Never smoker          No passive smoke exposure. ALCOHOL  There is a history of alcohol use. consumed socially. CAFFEINE  The patient uses caffeine: coffee - 2 cups a day. LIFESTYLE  Moderate activity level. Exercises 3-4 times a week. DIET  healthy. Patient Status   Completed with information received for patient transitioning into care. Medication Reconciliation  Medications reconciled today.   Medication Reviewed  Adherence Medication Name Sig Desc Elsewhere Status   taking as directed Centrum Silver tablet  N Verified   taking as directed Caltrate-600 + D Vit D3 (800) 600 mg (1,500 mg)-800 unit tablet  N Verified   taking as directed Fish Oil 360 mg-1,200 mg capsule take 1 by Oral route  every day Y Verified   taking as directed Align 4 mg capsule  Y Verified   taking as directed Metamucil 3.4 gram/5.4 gram oral powder  Y Verified   taking as directed tretinoin 0.05 % topical cream apply by topical route  every day to the affected area(s) at bedtime N Verified   taking as directed biotin 5,000 mcg disintegrating tablet  N Verified   taking as directed Protopic 0.1 % topical ointment apply by topical route  every day a thin layer to the affected area(s) ; rub in gently and completely N Verified   taking as directed clobetasol 0.05 % topical foam apply by topical route 2 times every day to the affected area(s) in the morning and evening N Verified   taking as directed Latisse 0.03 % eyelash drops apply 1 drop by topical route  every day to applicator and apply to upper eyelid, along eyelashes, at nighttime N Verified   taking as directed Suprep Bowel Prep Kit 17.5 gram-3.13 gram-1.6 gram oral solution take as prescribed by physician N Verified   taking as directed doxycycline hyclate 100 mg capsule take 1 capsule by oral route  every day N Verified     Medications (added, continued or stopped this visit):  Started Medication Directions Instruction Stopped    Align 4 mg capsule      02/07/2018 biotin 5,000 mcg disintegrating tablet      12/08/2013 Caltrate-600 + D Vit D3 (800) 600 mg (1,500 mg)-800 unit tablet      12/08/2013 Centrum Silver tablet      02/14/2018 clobetasol 0.05 % topical foam apply by topical route 2 times every day to the affected area(s) in the morning and evening     04/05/2018 doxycycline hyclate 100 mg capsule take 1 capsule by oral route  every day     06/09/2014 Fish Oil 360 mg-1,200 mg capsule take 1 by Oral route  every day     02/14/2018 Latisse 0.03 % eyelash drops apply 1 drop by topical route  every day to applicator and apply to upper eyelid, along eyelashes, at nighttime      Metamucil 3.4 gram/5.4 gram oral powder      02/14/2018 Protopic 0.1 % topical ointment apply by topical route  every day a thin layer to the affected area(s) ; rub in gently and completely     04/19/2018 Suprep Bowel Prep Kit 17.5 gram-3.13 gram-1.6 gram oral solution take as prescribed by physician     02/05/2018 tretinoin 0.05 % topical cream apply by topical route  every day to the affected area(s) at bedtime       Allergies:  Ingredient Reaction Medication Name Comment   BRIMONIDINE TARTRATE Discomfort;Stomach Pain;Chest pain Mirvaso    (Reviewed, no changes.)    Review of Systems  System Neg/Pos Details   Constitutional Negative Chills, fever, malaise, weight gain and weight loss. ENMT Negative Nasal congestion.    Eyes Negative Double vision. Respiratory Negative Asthma and chronic cough. Cardio Negative Chest pain, edema and irregular heartbeat/palpitations. GI Negative Abdominal pain, change in bowel habits, change in stool caliber, constipation, decreased appetite, diarrhea, dysphagia, heartburn, hematemesis, hematochezia, melena, nausea, rectal bleeding, reflux and vomiting.  Negative Dysuria and hematuria. Endocrine Negative Cold intolerance, heat intolerance and increased thirst.   Neuro Negative Dizziness, headache, numbness, tremors and vertigo. Psych Negative Anxiety, depression and increased stress. Integumentary Negative Hives and rash. MS Positive Joint pain. MS Negative Back pain and myalgia. Hema/Lymph Negative Easy bleeding and easy bruising. Allergic/Immuno Positive Seasonal allergies. Allergic/Immuno Negative Contact allergy and food allergies. Vital Signs     Height  Time ft in cm Last Measured Height Position   11:03 AM 5.0 3.50 161.29 04/19/2018 Standing     Weight/BSA/BMI  Time lb oz kg Context BMI kg/m2 BSA m2   11:03 .00  56.699 dressed with shoes 21.80      Date/Time Temp Pulse BP MAP (Calculated) Arterial Line 1 BP (mmHg) BP Patient Position Resp SpO2 O2 Device O2 Flow Rate (L/min) Pre/Post Ductal Weight      04/27/18 1146 98 °F (36.7 °C) 71 142/86 105 -- -- 16 97 % Room air -- -- --     04/27/18 1135 -- -- -- -- -- -- -- -- -- -- -- 54.5 kg (120 lb 4 oz)       PHYSICAL EXAM:  Exam Findings Details   Constitutional Normal No acute distress. Well Nourished. Well developed.    Eyes Normal General - Right: Normal, Left: Normal. Conjunctiva - Right: Normal, Left: Normal. Sclera - Right: Normal, Left: Normal. Pupil - Right: Normal, Left: Normal.   Nose/Mouth/Throat Normal Lips/teeth/gums - Normal. Tongue - Normal. Buccal mucosa - Normal. Palate & uvula - Normal.   Neck Exam Normal Inspection - Normal. Palpation - Normal. Thyroid gland - Normal. Submandibular lymph nodes - Normal.   Lymph Detail Normal Submandibular. Anterior cervical. Posterior cervical. Supraclavicular. Respiratory Normal Inspection - Normal. Auscultation - Normal. Percussion - Normal. Cough - Absent. Effort - Normal.   Cardiovascular Normal Heart rate - Regular rate. Heart sounds - Normal S1, Normal S2. Murmurs - None. Extremities - No edema. Abdomen Normal Inspection - Normal. Appliance(s) - None. Abdominal muscles - Normal. Auscultation - Normal. Percussion - Normal. Anterior palpation - Normal, No guarding, No rebound. CVA tenderness - None. Umbilicus - Normal. No abdominal tenderness. No hepatic enlargement. No splenic enlargement. No hernia. No Ascites. No palpable mass. Mattson's sign - Negative. Skin Normal Inspection - Normal.   Musculoskeletal Normal Hands - Left: Normal, Right: Normal.   Extremity Normal No cyanosis. No edema. Clubbing - Absent. Neurological Normal Level of consciousness - Normal. Orientation - Normal. Memory - Normal. Motor - Normal. Balance & gait - Normal. Coordination - Normal. Fine motor skills - Normal. DTRs - Normal.   Psychiatric Normal Orientation - Oriented to time, place, person & situation. Not anxious. Appropriate mood and affect. Behavior appropriate for age. Sufficient fund of knowledge. Sufficient language. No memory loss. Assessment/Plan  # Detail Type Description    1. Assessment Personal history of colonic polyps (Z86.010). Patient Plan  colonoscopy by Dr Ventura Langley in 2013 one small tubular adenoma in the sigmoid and  one medium sized hyperplastic polyp in the ascending . Mild sigmoid diverticulosis, small internal hemorrhoids. She watching her diet and exercising. appendectomy, . No smoking or ETOH abuse. She has average risk for colon cancer. She is alert asymptomatic. She is taking probiotic daily and has no longer diarrhea. She would be having her second colonoscopy.  I explained to her the procedure of colonoscopy and the risks involved which include but not limited to reaction to sedation, bleeding, perforation, infection or missing a lesion if bowels are not well prepped or are unusually tortuous. she agreed to proceed with the procedure and answered her questions. I gave her the Suprep to clean her bowels. She is in excellent health.   Her sister had breast cancer

## (undated) DEVICE — NDL PRT INJ NSAF BLNT 18GX1.5 --

## (undated) DEVICE — WRISTBAND ID AD W2.5XL9.5CM RED VYN ADH CLSR UNI-PRINT

## (undated) DEVICE — SOLUTION IV 500ML 0.9% SOD CHL FLX CONT

## (undated) DEVICE — MEDI-VAC NON-CONDUCTIVE SUCTION TUBING: Brand: CARDINAL HEALTH

## (undated) DEVICE — SYR 5ML 1/5 GRAD LL NSAF LF --

## (undated) DEVICE — SET ADMIN 16ML TBNG L100IN 2 Y INJ SITE IV PIGGY BK DISP

## (undated) DEVICE — CANNULA CUSH AD W/ 14FT TBG

## (undated) DEVICE — ERBE NESSY®PLATE 170 SPLIT; 168CM²; CABLE 3M: Brand: ERBE

## (undated) DEVICE — SYRINGE 50ML E/T

## (undated) DEVICE — SPONGE GZ W4XL4IN COT 12 PLY TYP VII WVN C FLD DSGN

## (undated) DEVICE — MAJ-1414 SINGLE USE ADPATER BIOPSY VALV: Brand: SINGLE USE ADAPTOR BIOPSY VALVE

## (undated) DEVICE — SINGLE PORT MANIFOLD: Brand: NEPTUNE 2

## (undated) DEVICE — SPONGE GZ W4XL4IN RAYON POLY 4 PLY NONWOVEN FASTER WICKING

## (undated) DEVICE — CATH IV SAFE STR 22GX1IN BLU -- PROTECTIV PLUS

## (undated) DEVICE — SNARE POLYP SM W13MMXL240CM SHTH DIA2.4MM OVL FLX DISP

## (undated) DEVICE — TRNQT TEXT 1X18IN BLU LF DISP -- CONVERT TO ITEM 362165

## (undated) DEVICE — NDL INJ SCLERO 25G 240CM -- INTERJECT M00518360 BX/5

## (undated) DEVICE — NDL FLTR TIP 5 MIC 18GX1.5IN --

## (undated) DEVICE — CATH SUC CTRL PRT TRIFLO 14FR --

## (undated) DEVICE — SYR 3ML LL TIP 1/10ML GRAD --

## (undated) DEVICE — KENDALL RADIOLUCENT FOAM MONITORING ELECTRODE RECTANGULAR SHAPE: Brand: KENDALL

## (undated) DEVICE — ENDO CARRY-ON PROCEDURE KIT INCLUDES ENZYMATIC SPONGE, GAUZE, BIOHAZARD LABEL, TRAY, LUBRICANT, DIRTY SCOPE LABEL, WATER LABEL, TRAY, DRAWSTRING PAD, AND DEFENDO 4-PIECE KIT.: Brand: ENDO CARRY-ON PROCEDURE KIT

## (undated) DEVICE — TRAP SPEC COLL POLYP POLYSTYR --

## (undated) DEVICE — KENDALL SCD EXPRESS SLEEVES, KNEE LENGTH, MEDIUM: Brand: KENDALL SCD

## (undated) DEVICE — GAUZE SPNG AVANT 4X4 4PLY NS --